# Patient Record
Sex: FEMALE | Race: WHITE
[De-identification: names, ages, dates, MRNs, and addresses within clinical notes are randomized per-mention and may not be internally consistent; named-entity substitution may affect disease eponyms.]

---

## 2017-05-21 ENCOUNTER — HOSPITAL ENCOUNTER (EMERGENCY)
Dept: HOSPITAL 10 - E/R | Age: 52
LOS: 1 days | Discharge: HOME | End: 2017-05-22
Payer: COMMERCIAL

## 2017-05-21 VITALS
BODY MASS INDEX: 32.03 KG/M2 | WEIGHT: 163.14 LBS | HEIGHT: 60 IN | HEIGHT: 60 IN | WEIGHT: 163.14 LBS | BODY MASS INDEX: 32.03 KG/M2

## 2017-05-21 DIAGNOSIS — R07.89: Primary | ICD-10-CM

## 2017-05-21 PROCEDURE — 85610 PROTHROMBIN TIME: CPT

## 2017-05-21 PROCEDURE — 84484 ASSAY OF TROPONIN QUANT: CPT

## 2017-05-21 PROCEDURE — 83880 ASSAY OF NATRIURETIC PEPTIDE: CPT

## 2017-05-21 PROCEDURE — 85730 THROMBOPLASTIN TIME PARTIAL: CPT

## 2017-05-21 PROCEDURE — 93005 ELECTROCARDIOGRAM TRACING: CPT

## 2017-05-21 PROCEDURE — 36415 COLL VENOUS BLD VENIPUNCTURE: CPT

## 2017-05-21 PROCEDURE — 80053 COMPREHEN METABOLIC PANEL: CPT

## 2017-05-21 PROCEDURE — 71010: CPT

## 2017-05-21 PROCEDURE — 85025 COMPLETE CBC W/AUTO DIFF WBC: CPT

## 2017-05-22 VITALS
DIASTOLIC BLOOD PRESSURE: 62 MMHG | RESPIRATION RATE: 16 BRPM | TEMPERATURE: 98.9 F | HEART RATE: 62 BPM | SYSTOLIC BLOOD PRESSURE: 120 MMHG

## 2017-05-22 LAB
ADD SCAN DIFF: NO
ALBUMIN SERPL-MCNC: 4.5 G/DL (ref 3.3–4.9)
ALBUMIN/GLOB SERPL: 1.12 {RATIO}
ALP SERPL-CCNC: 72 IU/L (ref 42–121)
ALT SERPL-CCNC: 42 IU/L (ref 13–69)
ANION GAP SERPL CALC-SCNC: 17 MMOL/L (ref 8–16)
APTT BLD: 38.9 SEC (ref 25–35)
AST SERPL-CCNC: 30 IU/L (ref 15–46)
BASOPHILS # BLD AUTO: 0.1 10^3/UL (ref 0–0.1)
BASOPHILS NFR BLD: 0.7 % (ref 0–2)
BILIRUB DIRECT SERPL-MCNC: 0 MG/DL (ref 0–0.2)
BILIRUB SERPL-MCNC: 0.2 MG/DL (ref 0.2–1.3)
BNP SERPL-MCNC: 54 PG/ML (ref 0–125)
BUN SERPL-MCNC: 20 MG/DL (ref 7–20)
CALCIUM SERPL-MCNC: 9.7 MG/DL (ref 8.4–10.2)
CHLORIDE SERPL-SCNC: 101 MMOL/L (ref 97–110)
CO2 SERPL-SCNC: 27 MMOL/L (ref 21–31)
CREAT SERPL-MCNC: 0.72 MG/DL (ref 0.44–1)
EOSINOPHIL # BLD: 0.2 10^3/UL (ref 0–0.5)
EOSINOPHIL NFR BLD: 2.6 % (ref 0–7)
ERYTHROCYTE [DISTWIDTH] IN BLOOD BY AUTOMATED COUNT: 13.8 % (ref 11.5–14.5)
GLOBULIN SER-MCNC: 4 G/DL (ref 1.3–3.2)
GLUCOSE SERPL-MCNC: 103 MG/DL (ref 70–220)
HCT VFR BLD CALC: 38.5 % (ref 37–47)
HGB BLD-MCNC: 12.8 G/DL (ref 12–16)
INR PPP: 1.06
LYMPHOCYTES # BLD AUTO: 4.2 10^3/UL (ref 0.8–2.9)
LYMPHOCYTES NFR BLD AUTO: 47.8 % (ref 15–51)
MCH RBC QN AUTO: 28.6 PG (ref 29–33)
MCHC RBC AUTO-ENTMCNC: 33.2 G/DL (ref 32–37)
MCV RBC AUTO: 86.1 FL (ref 82–101)
MONOCYTES # BLD: 0.6 10^3/UL (ref 0.3–0.9)
MONOCYTES NFR BLD: 6.4 % (ref 0–11)
NEUTROPHILS # BLD: 3.7 10^3/UL (ref 1.6–7.5)
NEUTROPHILS NFR BLD AUTO: 42.4 % (ref 39–77)
NRBC # BLD MANUAL: 0 10^3/UL (ref 0–0)
NRBC BLD QL: 0 /100WBC (ref 0–0)
PLATELET # BLD: 212 10^3/UL (ref 140–415)
PMV BLD AUTO: 10.6 FL (ref 7.4–10.4)
POTASSIUM SERPL-SCNC: 3.8 MMOL/L (ref 3.5–5.1)
PROT SERPL-MCNC: 8.5 G/DL (ref 6.1–8.1)
PROTHROMBIN TIME: 13.8 SEC (ref 12.2–14.2)
PT RATIO: 1.1
RBC # BLD AUTO: 4.47 10^6/UL (ref 4.2–5.4)
SODIUM SERPL-SCNC: 141 MMOL/L (ref 135–144)
TROPONIN I SERPL-MCNC: < 0.012 NG/ML (ref 0–0.12)
WBC # BLD AUTO: 8.7 10^3/UL (ref 4.8–10.8)

## 2017-05-22 NOTE — RADRPT
PROCEDURE:   Portable chest x-ray. 

 

CLINICAL INDICATION:   Chest pain. 

 

TECHNIQUE:   Portable AP view of the chest. 

 

COMPARISON:   None.

 

FINDINGS:

No pulmonary edema or conolidation is identified.  The cardiac silhouette is magnified.  No pleural 
effusion is seen.  There is no pneumothorax.  

 

IMPRESSION:

1.  No evidence of acute cardiopulmonary disease. 

 

 

 

 

RPTAT: HTAR

_____________________________________________ 

.Neo Mtz MD, MD           Date    Time 

Electronically viewed and signed by .Neo Mtz MD, MD on 05/22/2017 02:20 

 

D:  05/22/2017 02:20  T:  05/22/2017 02:20

.R/

## 2017-05-22 NOTE — ERD
ER Documentation


Chief Complaint


Date/Time


DATE: 17 


TIME: 04:07


Chief Complaint


CWP x3 days.





HPI


This is a very pleasant 51-year-old who comes in because of chest wall pain for 

3 days.  He does note low-grade fevers or chills which is pain is reproducible 

to touch.  No other current complaints.  Pain is mild to moderate intensity.  

No shortness of breath no diaphoresis no palpitations.





ROS


All systems reviewed and are negative except as per history of present illness.





Medications


Home Meds


Active Scripts


Hydrocodone Bit-Acetaminophen* (Norco*) 5-325 Mg Tab, 1 TAB PO DAILY Y for PAIN

, #15 TAB 0 Refills


   Prov:BHAVYA MAIER PA-C         16


Carisoprodol* (Soma*) 350 Mg Tablet, 350 MG PO TID Y for MUSCLE SPASMS, #15 TAB


   Prov:NADIA MUNIZ MD         8/31/15





Allergies


Allergies:  


Coded Allergies:  


     No Known Allergy (Unverified , 8/31/15)





PMhx/Soc


History of Surgery:  Yes (cholecystectomy)


Anesthesia Reaction:  No


Hx Neurological Disorder:  No


Hx Respiratory Disorders:  No


Hx Cardiac Disorders:  No


Hx Psychiatric Problems:  No


Hx Miscellaneous Medical Probl:  Yes (HYPERCHOLESTEROLEMIA)


Hx Alcohol Use:  No


Hx Substance Use:  No


Hx Tobacco Use:  No


Smoking Status:  Never smoker





Physical Exam


Vitals





Vital Signs








  Date Time  Temp Pulse Resp B/P Pulse Ox O2 Delivery O2 Flow Rate FiO2


 


17 03:41  61 16 119/71 100 Room Air  


 


17 23:07 98.2 80 20 167/76 98   








Physical Exam


Const:  []


Head:   Atraumatic 


Eyes:    Normal Conjunctiva


ENT:    Normal External Ears, Nose and Mouth.


Neck:               Full range of motion..~ No meningismus.


Resp:    Clear to auscultation bilaterally


Cardio:    Regular rate and rhythm, no murmurs


Abd:    Soft, non tender, non distended. Normal bowel sounds


Skin:    No petechiae or rashes


Back:    No midline or flank tenderness


Ext:    No cyanosis, or edema


Neur:    Awake and alert


Psych:    Normal Mood and Affect


Result Diagram:  


17 0200                                                                   

             17 0200





Results 24 hrs





 Laboratory Tests








Test


  17


02:00


 


White Blood Count 8.710^3/ul 


 


Red Blood Count 4.4710^6/ul 


 


Hemoglobin 12.8g/dl 


 


Hematocrit 38.5% 


 


Mean Corpuscular Volume 86.1fl 


 


Mean Corpuscular Hemoglobin 28.6pg 


 


Mean Corpuscular Hemoglobin


Concent 33.2g/dl 


 


 


Red Cell Distribution Width 13.8% 


 


Platelet Count 33253^3/UL 


 


Mean Platelet Volume 10.6fl 


 


Neutrophils % 42.4% 


 


Lymphocytes % 47.8% 


 


Monocytes % 6.4% 


 


Eosinophils % 2.6% 


 


Basophils % 0.7% 


 


Nucleated Red Blood Cells % 0.0/100WBC 


 


Neutrophils # 3.710^3/ul 


 


Lymphocytes # 4.210^3/ul 


 


Monocytes # 0.610^3/ul 


 


Eosinophils # 0.210^3/ul 


 


Basophils # 0.110^3/ul 


 


Nucleated Red Blood Cells # 0.010^3/ul 


 


Prothrombin Time 13.8Sec 


 


Prothrombin Time Ratio 1.1 


 


INR International Normalized


Ratio 1.06 


 


 


Activated Partial


Thromboplast Time 38.9Sec 


 


 


Sodium Level 141mmol/L 


 


Potassium Level 3.8mmol/L 


 


Chloride Level 101mmol/L 


 


Carbon Dioxide Level 27mmol/L 


 


Anion Gap 17 


 


Blood Urea Nitrogen 20mg/dl 


 


Creatinine 0.72mg/dl 


 


Glucose Level 103mg/dl 


 


Calcium Level 9.7mg/dl 


 


Total Bilirubin 0.2mg/dl 


 


Direct Bilirubin 0.00mg/dl 


 


Indirect Bilirubin 0.2mg/dl 


 


Aspartate Amino Transf


(AST/SGOT) 30IU/L 


 


 


Alanine Aminotransferase


(ALT/SGPT) 42IU/L 


 


 


Alkaline Phosphatase 72IU/L 


 


Troponin I < 0.012ng/ml 


 


B-Type Natriuretic Peptide 54PG/ML 


 


Total Protein 8.5g/dl 


 


Albumin 4.5g/dl 


 


Globulin 4.00g/dl 


 


Albumin/Globulin Ratio 1.12 











Procedures/MDM


EKG: 


Rate/Rhythm:             [Normal Sinus Rhythm]


QRS, ST, T-waves:    [No changes consistent w/ acute ischemia]


Impression:      [No evidence of ischemia or arrhythmia]





Chest X-ray 1V Interpreted by me:


Soft Tissue:                                               No acute 

abnormalities


Bones:                                                    No acute abnormalities


Mediastinum/Cardiac Silhouette/Lungs:     [No acute abnormalities]





Medical decision-m[age]-year-old female with atypical chest pain.  At this 

point clinically stable for outpatient management.  Patient will be discharged 

home.





Departure


Diagnosis:  


 Primary Impression:  


 Chest wall pain


Condition:  Stable


Patient Instructions:  Chest Wall Strain











NATHANAEL BARRETT May 22, 2017 04:19

## 2019-02-23 ENCOUNTER — HOSPITAL ENCOUNTER (EMERGENCY)
Dept: HOSPITAL 10 - FTE | Age: 54
Discharge: HOME | End: 2019-02-23
Payer: COMMERCIAL

## 2019-02-23 ENCOUNTER — HOSPITAL ENCOUNTER (EMERGENCY)
Dept: HOSPITAL 91 - FTE | Age: 54
Discharge: HOME | End: 2019-02-23
Payer: COMMERCIAL

## 2019-02-23 VITALS — BODY MASS INDEX: 3.06 KG/M2 | HEIGHT: 55 IN | WEIGHT: 13.23 LBS

## 2019-02-23 VITALS — HEART RATE: 79 BPM | RESPIRATION RATE: 16 BRPM | DIASTOLIC BLOOD PRESSURE: 65 MMHG | SYSTOLIC BLOOD PRESSURE: 138 MMHG

## 2019-02-23 DIAGNOSIS — L50.9: Primary | ICD-10-CM

## 2019-02-23 PROCEDURE — 96372 THER/PROPH/DIAG INJ SC/IM: CPT

## 2019-02-23 PROCEDURE — 99284 EMERGENCY DEPT VISIT MOD MDM: CPT

## 2019-02-23 RX ADMIN — DEXAMETHASONE SODIUM PHOSPHATE 1 MG: 10 INJECTION, SOLUTION INTRAMUSCULAR; INTRAVENOUS at 21:00

## 2019-02-23 RX ADMIN — FAMOTIDINE 1 MG: 20 TABLET ORAL at 21:00

## 2019-02-23 RX ADMIN — DIPHENHYDRAMINE HYDROCHLORIDE 1 MG: 50 INJECTION, SOLUTION INTRAMUSCULAR; INTRAVENOUS at 21:00

## 2019-02-23 NOTE — ERD
ER Documentation


Chief Complaint


Chief Complaint





RASH TO CHEST WALL SINCE THIS MORNING





HPI


This is a 53-year-old female who was accompanied by her son here in emergency 


department with complaints of rash and hives to chest, back, bilateral upper 


extremity started upon waking up this morning.  Patient did not remember any 


changes in diet, detergents, soap.





LMP:   





Denies headache, head injury, loss of consciousness, dizziness, neck pain, neck 


stiffness, throat pain, difficulty swallowing, difficulty breathing lying flat, 


shoulder pain, chest pain, back pain, abdominal pain, nausea, vomiting, 


constipation, diarrhea, urinary symptoms, pregnancy or possibility being 


pregnant, loss of bowel and bladder control, trauma, injury, falls, difficulty 


walking due to pain, numbness or tingling sensation, calf pain, recent travel, 


recent major surgery in the last 3 weeks, calf pain, recent long travel, recent 


exposure to any illness, recent antibiotic use in the last 3 months, fever, 


chills, seizures.





Past medical history:


Surgical history:


Social: Denies smoking, use of alcoholic beverages, use of illegal drugs.





ROS


All systems reviewed and are negative except as per history of present illness.





Medications


Home Meds


Active Scripts


Ondansetron Hcl* (Zofran*) 4 Mg Tablet, 4 MG PO Q8H PRN for NAUSEA AND/OR 


VOMITING, #30 TAB


   Prov:JYOTHIROSIRACHANA         2/23/19


Epinephrine (Epipen Jr 2-Syed) 0.15 Mg/0.3 Ml Pen.injctr, 1 EA INJ ONCE PRN for 


ALLERGIC REACTION, #1 EA


   Prov:JYOTHIROSIRACHANA         2/23/19


Famotidine* (Pepcid*) 20 Mg Tablet, 40 MG PO DAILY for 30 Days, TAB


   Prov:JYOTHIROSIRACHANA         2/23/19


Loratadine* (Claritin*) 10 Mg Capsule, 10 MG PO DAILY, #30 CAP


   Prov:JYOTHIILARENEARACHANA MOSS         2/23/19


Diphenhydramine Hcl* (Benadryl*) 25 Mg Cap, 25 MG PO Q6 PRN for ITCHING, #30 CAP


   Prov:JYOTHIROSIRUSSELLAR ISA         2/23/19


Prednisone* (Prednisone*) 20 Mg Tab, 20 MG PO DAILY for 3 Days, TAB


   Prov:PASILARENEARUSSELLAR ISA         2/23/19


Hydrocodone Bit-Acetaminophen* (Norco*) 5-325 Mg Tab, 1 TAB PO DAILY PRN for 


PAIN, #15 TAB 0 Refills


   Prov:BHAVYA MAIER PA-C         2/14/16


Carisoprodol* (Soma*) 350 Mg Tablet, 350 MG PO TID PRN for MUSCLE SPASMS, #15 


TAB


   Prov:NADIA MUNIZ MD         8/31/15





Allergies


Allergies:  


Coded Allergies:  


     No Known Allergy (Unverified , 8/31/15)





PMhx/Soc


History of Surgery:  Yes (cholecystectomy)


Anesthesia Reaction:  No


Hx Neurological Disorder:  No


Hx Respiratory Disorders:  No


Hx Cardiac Disorders:  No


Hx Psychiatric Problems:  No


Hx Miscellaneous Medical Probl:  Yes (HYPERCHOLESTEROLEMIA)


Hx Alcohol Use:  No


Hx Substance Use:  No


Hx Tobacco Use:  No


Smoking Status:  Never smoker





Physical Exam


Vitals





Physical Exam


Const:   No acute distress


Head:   Atraumatic 


Eyes:    Normal Conjunctiva


ENT:    Normal External Ears, Nose and Mouth. Throat: Uvula is midline and 


nondisplaced.  Tonsils are +1 bilaterally without redness without exudates.  


Tolerating secretions.  Patent airway.  Speaks full and clear sentences.  No 


tripoding.  No signs of lip swelling.  No facial edema.  No signs of angioedema.


Neck:               Full range of motion. No meningismus.


Resp:   Clear to auscultation bilaterally.  Respirations even and unlabored.  No


accessory muscle use in breathing.


Cardio:   Regular rate and rhythm, no murmurs


Abd:    Soft, non tender, non distended. Normal bowel sounds


Skin:   No petechiae or rashes. Hives to chest, back, bilateral upper 


extremities.


Back:   No midline or flank tenderness


Ext:    No cyanosis, or edema


Neur:   Awake and alert.  No neurological deficits.


Psych:    Normal Mood and Affect


Results 24 hrs





Current Medications


 Medications
   Dose
          Sig/Sathish
       Start Time
   Status  Last


 (Trade)       Ordered        Route
 PRN     Stop Time              Admin
Dose


                              Reason                                Admin


                10 mg          ONCE  ONCE
    2/23/19       DC           2/23/19


Dexamethasone                 IM
            21:00
                       21:00




  (Decadron)                                2/23/19 21:01


                25 mg          ONCE  ONCE
    2/23/19       DC           2/23/19


Diphenhydrami                 IM
            21:00
                       21:00



ne
 HCl
                                     2/23/19 21:01


(Benadryl)


 Famotidine
    40 mg          ONCE  ONCE
    2/23/19       DC           2/23/19


(Pepcid)                      PO
            21:00
                       21:00



                                             2/23/19 21:01








Procedures/MDM


Diagnostic tests: Clinical exam.





Treatment: Dexamethasone IM.  Pepcid p.o.  Benadryl IM.





Re-evaluation: Hives has decreased tremendously.  Respirations even and 


unlabored.  No accessory muscle use in breathing.  Lung sounds are clear to 


auscultation.  No airway obstruction.  No signs of angioedema.  Patient and 


family member stated they are comfortable going home.





Differential diagnosis


I have low suspicion for anaphylactic shock, angioedema, bronchospasm, Mixon-


Mike syndrome, shingles, chickenpox.





Final diagnosis: Hives.  Rash.





Prescription: Prednisone.  Benadryl.  Claritin.  EpiPen.  Pepcid.  Zofran.





Follow-up with PCP in the next 24-48 hours.  PCP to do an allergy test for foods


and environment.  Come back here in the emergency department for any new 


symptoms or any worsening symptoms.  





All questions and concerns were answered.  Patient and family members verbalized


understanding and agreed with plan of care.  





Hemodynamically stable on discharge.





Departure


Diagnosis:  


   Primary Impression:  


   Hives


   Additional Impression:  


   Rash


Condition:  Stable





Additional Instructions:  


Follow-up with PCP in the next 24-48 hours.  PCP to do an allergy test for foods


and environment.  Come back here in the emergency department for any new 


symptoms or any worsening symptoms.











RACHANA PURI               Feb 23, 2019 21:16